# Patient Record
Sex: FEMALE | Race: WHITE
[De-identification: names, ages, dates, MRNs, and addresses within clinical notes are randomized per-mention and may not be internally consistent; named-entity substitution may affect disease eponyms.]

---

## 2017-12-11 ENCOUNTER — HOSPITAL ENCOUNTER (EMERGENCY)
Dept: HOSPITAL 39 - ER | Age: 47
Discharge: HOME | End: 2017-12-11
Payer: COMMERCIAL

## 2017-12-11 VITALS — SYSTOLIC BLOOD PRESSURE: 88 MMHG | DIASTOLIC BLOOD PRESSURE: 65 MMHG | OXYGEN SATURATION: 98 %

## 2017-12-11 VITALS — TEMPERATURE: 97.7 F

## 2017-12-11 DIAGNOSIS — S61.213A: Primary | ICD-10-CM
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
## 2017-12-11 NOTE — ED.PDOC
History of Present Illness





- General


Chief Complaint: Laceration


Stated Complaint: laceration


Time Seen by Provider: 12/11/17 13:37


Source: patient


Exam Limitations: no limitations





- History of Present Illness


Initial Comments: 





The patient is a 47-year-old  female presenting to the emergency room 

secondary to a laceration to the tip of the palmar aspect of the third digit of 

the left hand.  Laceration is approximately 1-1/2 cm in length and one third of 

a centimeter in width.  It is fairly superficial but does make it through the 

skin for a very small part.  She is neurovascularly intact.  There is no 

evidence of any bony injury or tendon laceration.  It does not cross the joint.

  No other injuries.  She cut it with a  at work.  Urine drug screen 

is being collected for their protocol.  The patient does not appear 

intoxicated.  She does appear appropriate.


Timing/Duration: 1/2 hour


Severity: mild


Improving Factors: nothing


Worsening Factors: nothing


Associated Symptoms: denies symptoms


Allergies/Adverse Reactions: 


Allergies





NO KNOWN ALLERGY Allergy (Verified 12/11/17 13:19)


 











Review of Systems





- Review of Systems


Constitutional: States: no symptoms reported


EENTM: States: no symptoms reported


Respiratory: States: no symptoms reported


Cardiology: States: no symptoms reported


Gastrointestinal/Abdominal: States: no symptoms reported


Genitourinary: States: no symptoms reported


Musculoskeletal: States: no symptoms reported


Skin: States: see HPI


Neurological: States: no symptoms reported


Endocrine: States: no symptoms reported


All other Systems: No Change from Baseline





Past Medical History (General)





- Patient Medical History


Hx Seizures: No


Hx Stroke: No


Hx Dementia: No


Hx Asthma: Yes


Hx of COPD: No


Hx Cardiac Disorders: No


Hx Congestive Heart Failure: No


Hx Pacemaker: No


Hx Hypertension: No


Hx Thyroid Disease: No


Hx Diabetes: No


Hx Gastroesophageal Reflux: No


Hx Renal Disease: No


Hx Cancer: No


Hx of HIV: No


Hx Hepatitis C: No


Hx MRSA: No


Surgical History: tonsillectomy





- Vaccination History


Hx Tetanus, Diphtheria Vaccination: Yes


Hx Influenza Vaccination: No





- Social History


Hx Tobacco Use: No


Hx Alcohol Use: No


Hx Substance Use: No


Hx Substance Use Treatment: No


Hx Depression: Yes


Hx Physical Abuse: No


Hx Emotional Abuse: No


Hx Suspected Abuse: No





Family Medical History





- Family History


  ** Mother


Living Status: Still Living


Hx Family;Other: Thyroid.  high cholesterol





Physical Exam





- Physical Exam


General Appearance: Alert, Comfortable, No apparent distress


Eye Exam: bilateral normal


Ears, Nose, Throat: hearing grossly normal


Neck: non-tender, full range of motion


Respiratory: no respiratory distress, no accessory muscle use


Cardiovascular/Chest: normal peripheral pulses, no edema


Peripheral Pulses: radial,right: 2+, radial,left: 2+


Rectal Exam: deferred


Extremity: normal range of motion, no pedal edema, normal capillary refill


Neurologic: CNs II-XII nml as tested, no motor/sensory deficits, alert, normal 

mood/affect, oriented x 3


Skin Exam: normal color - laceration as above


Comments: 





 Vital Signs (72 hours)











  12/11/17





  13:10


 


Temperature 97.7 F


 


Pulse Rate [ 93 H





pulse ox] 


 


Respiratory 20





Rate 


 


Blood Pressure 101/65





[Right Arm] 


 


O2 Sat by Pulse 97





Oximetry 














Progress





- Progress


Progress: 





12/11/17 13:39


the patient is a 47-year-old  female presenting with a laceration to 

the tip of the third digit of the left hand from a  at work.  The 

wound was irrigated with water and then with hydrogen peroxide.  the wound is 

actually too superficial to suture closed.  Therefore the wound is left open 

and covered with an antibiotic ointment and a Band-Aid.  The wound is 

hemostatic at this time.  estimated blood loss in total was probably 3-5 cc.  

She should monitor for any evidence of infection.  She was given 1 dose of 

Bactrim here prior to discharge.  Urine drug screen is being sent off as per 

her employer's protocol.





Departure





- Departure


Clinical Impression: 


 Accidental laceration





Disposition: Discharge to Home or Self Care


Condition: Fair


Departure Forms:  ED Discharge - Pt. Copy, Patient Portal Self Enrollment


Instructions:  DI for Laceration Repair, DI for Abrasion


Diet: regular diet


Activity: increase activity as tolerated


Additional Instructions: 


the patient is a 47-year-old  female presenting with a laceration to 

the tip of the third digit of the left hand from a  at work.  The 

wound was irrigated with water and then with hydrogen peroxide.  the wound is 

actually too superficial to suture closed.  Therefore the wound is left open 

and covered with an antibiotic ointment and a Band-Aid.  The wound is 

hemostatic at this time.  estimated blood loss in total was probably 3-5 cc.  

She should monitor for any evidence of infection.  She was given 1 dose of 

Bactrim here prior to discharge.  Urine drug screen is being sent off as per 

her employer's protocol.

## 2018-01-31 ENCOUNTER — HOSPITAL ENCOUNTER (OUTPATIENT)
Dept: HOSPITAL 39 - LAB.O | Age: 48
End: 2018-01-31
Attending: OBSTETRICS & GYNECOLOGY
Payer: COMMERCIAL

## 2018-01-31 DIAGNOSIS — Z01.419: Primary | ICD-10-CM

## 2019-09-27 ENCOUNTER — HOSPITAL ENCOUNTER (OUTPATIENT)
Dept: HOSPITAL 39 - US | Age: 49
End: 2019-09-27
Attending: NURSE PRACTITIONER
Payer: COMMERCIAL

## 2019-09-27 DIAGNOSIS — R80.9: Primary | ICD-10-CM

## 2019-09-27 NOTE — US
EXAM DESCRIPTION: Renal



CLINICAL HISTORY: 49 years Female, PROTEINURIA



COMPARISON: None.



TECHNIQUE: Retroperitoneal sonogram was performed to evaluate the

kidneys and bladder.



FINDINGS:



Right kidney



Right renal length is 11.5 cm. Renal cortical thickness and

echogenicity are normal. No right renal mass, cyst or shadowing

stone. No hydronephrosis.



Left kidney



Left renal length is 12.1 cm. Renal cortical thickness and

echogenicity are normal. No left renal mass, cyst or shadowing

stone. No hydronephrosis.



Urinary bladder



No images of the urinary bladder were submitted.



IMPRESSION:



Normal sonographic appearance of the kidneys.



Electronically signed by:  Jean Claude Delacruz MD  9/27/2019 6:47

PM CDT Workstation: [id number]